# Patient Record
Sex: FEMALE | Race: WHITE | NOT HISPANIC OR LATINO | URBAN - METROPOLITAN AREA
[De-identification: names, ages, dates, MRNs, and addresses within clinical notes are randomized per-mention and may not be internally consistent; named-entity substitution may affect disease eponyms.]

---

## 2018-05-14 ENCOUNTER — APPOINTMENT (OUTPATIENT)
Dept: WOMENS IMAGING | Facility: HOSPITAL | Age: 53
End: 2018-05-14

## 2018-05-14 PROCEDURE — 77067 SCR MAMMO BI INCL CAD: CPT | Performed by: RADIOLOGY

## 2018-05-14 PROCEDURE — 77063 BREAST TOMOSYNTHESIS BI: CPT | Performed by: RADIOLOGY

## 2018-07-05 ENCOUNTER — PROCEDURE VISIT (OUTPATIENT)
Dept: OBSTETRICS AND GYNECOLOGY | Facility: CLINIC | Age: 53
End: 2018-07-05

## 2018-07-05 ENCOUNTER — OFFICE VISIT (OUTPATIENT)
Dept: OBSTETRICS AND GYNECOLOGY | Facility: CLINIC | Age: 53
End: 2018-07-05

## 2018-07-05 VITALS
HEIGHT: 68 IN | DIASTOLIC BLOOD PRESSURE: 60 MMHG | SYSTOLIC BLOOD PRESSURE: 94 MMHG | WEIGHT: 151 LBS | BODY MASS INDEX: 22.88 KG/M2

## 2018-07-05 DIAGNOSIS — R14.0 BLOATING: ICD-10-CM

## 2018-07-05 DIAGNOSIS — R14.0 BLOATING: Primary | ICD-10-CM

## 2018-07-05 DIAGNOSIS — Z01.419 WELL FEMALE EXAM WITH ROUTINE GYNECOLOGICAL EXAM: Primary | ICD-10-CM

## 2018-07-05 DIAGNOSIS — Z11.51 SPECIAL SCREENING EXAMINATION FOR HUMAN PAPILLOMAVIRUS (HPV): ICD-10-CM

## 2018-07-05 DIAGNOSIS — K59.09 OTHER CONSTIPATION: ICD-10-CM

## 2018-07-05 DIAGNOSIS — Z11.3 SCREENING EXAMINATION FOR STD (SEXUALLY TRANSMITTED DISEASE): ICD-10-CM

## 2018-07-05 LAB
B-HCG UR QL: NEGATIVE
BILIRUB BLD-MCNC: NEGATIVE MG/DL
CLARITY, POC: CLEAR
COLOR UR: YELLOW
GLUCOSE UR STRIP-MCNC: NEGATIVE MG/DL
INTERNAL NEGATIVE CONTROL: NEGATIVE
INTERNAL POSITIVE CONTROL: POSITIVE
KETONES UR QL: NEGATIVE
LEUKOCYTE EST, POC: NEGATIVE
Lab: NORMAL
NITRITE UR-MCNC: NEGATIVE MG/ML
PH UR: 6 [PH] (ref 5–8)
PROT UR STRIP-MCNC: NEGATIVE MG/DL
RBC # UR STRIP: NEGATIVE /UL
SP GR UR: 1.02 (ref 1–1.03)
UROBILINOGEN UR QL: NORMAL

## 2018-07-05 PROCEDURE — 81002 URINALYSIS NONAUTO W/O SCOPE: CPT | Performed by: OBSTETRICS & GYNECOLOGY

## 2018-07-05 PROCEDURE — 99213 OFFICE O/P EST LOW 20 MIN: CPT | Performed by: OBSTETRICS & GYNECOLOGY

## 2018-07-05 PROCEDURE — 81025 URINE PREGNANCY TEST: CPT | Performed by: OBSTETRICS & GYNECOLOGY

## 2018-07-05 PROCEDURE — 76830 TRANSVAGINAL US NON-OB: CPT | Performed by: OBSTETRICS & GYNECOLOGY

## 2018-07-05 PROCEDURE — 99386 PREV VISIT NEW AGE 40-64: CPT | Performed by: OBSTETRICS & GYNECOLOGY

## 2018-07-05 RX ORDER — TRIAMTERENE AND HYDROCHLOROTHIAZIDE 37.5; 25 MG/1; MG/1
1 CAPSULE ORAL EVERY MORNING
COMMUNITY

## 2018-07-05 NOTE — PROGRESS NOTES
GYN Annual Exam     CC- Here for annual exam.     Allyn Carpenter is a 53 y.o. female new patient who presents for annual well woman exam. She is still having cycles but they are starting to spread out some. She skipped her first month this summer. She is noticing bloating. She has a h/o chronic constipation and needs to have a C scope. She is moodier the past few years, more PMS, declines meds.       OB History      Para Term  AB Living    2 2 2     2    SAB TAB Ectopic Molar Multiple Live Births                       Obstetric Comments    2 C/S          Menarche:15  Menopause:not yet  HRT:none  Current contraception: vasectomy  History of abnormal Pap smear: yes - s/p cryp with nl f/u  History of abnormal mammogram: yes - mult B9 breast bx  Family history of uterine, colon or ovarian cancer: no  Family history of breast cancer: no  STD's: none    Health Maintenance   Topic Date Due   • ANNUAL PHYSICAL  1968   • TDAP/TD VACCINES (1 - Tdap) 1984   • ZOSTER VACCINE (1 of 2) 2015   • MAMMOGRAM  2018   • PAP SMEAR  2018   • COLONOSCOPY  2018   • INFLUENZA VACCINE  2018   • HEPATITIS C SCREENING  Completed       Past Medical History:   Diagnosis Date   • Abnormal Pap smear of cervix    • Cervical dysplasia     s/p cryo wtih normal f/u   • Meniere disease        Past Surgical History:   Procedure Laterality Date   • BREAST BIOPSY     •  SECTION      x 2   • GYNECOLOGIC CRYOSURGERY           Current Outpatient Prescriptions:   •  triamterene-hydrochlorothiazide (DYAZIDE) 37.5-25 MG per capsule, Take 1 capsule by mouth Every Morning., Disp: , Rfl:     Allergies no known allergies    Social History   Substance Use Topics   • Smoking status: Never Smoker   • Smokeless tobacco: Not on file   • Alcohol use No       Family History   Problem Relation Age of Onset   • Breast cancer Neg Hx    • Ovarian cancer Neg Hx    • Colon cancer Neg Hx    • Deep vein thrombosis  "Neg Hx    • Pulmonary embolism Neg Hx        Review of Systems   Constitutional: Negative for appetite change, fatigue, fever and unexpected weight change.   HENT: Positive for hearing loss (meniere's).    Respiratory: Negative for cough and shortness of breath.    Cardiovascular: Negative for chest pain and palpitations.   Gastrointestinal: Positive for abdominal distention and constipation. Negative for abdominal pain, diarrhea and nausea.   Endocrine: Negative for cold intolerance and heat intolerance.   Genitourinary: Negative for dyspareunia, dysuria, menstrual problem, pelvic pain and vaginal discharge.   Skin: Negative for color change and rash.   Neurological: Negative for headaches.   Hematological: Negative for adenopathy. Does not bruise/bleed easily.   Psychiatric/Behavioral: Positive for behavioral problems (moodier). Negative for dysphoric mood. The patient is not nervous/anxious.        BP 94/60   Ht 171.5 cm (67.5\")   Wt 68.5 kg (151 lb)   LMP 06/04/2018 (Within Days)   BMI 23.30 kg/m²     Physical Exam   Constitutional: She is oriented to person, place, and time. She appears well-developed and well-nourished.   HENT:   Head: Normocephalic and atraumatic.   Eyes: Conjunctivae are normal. No scleral icterus.   Neck: Neck supple. No thyromegaly present.   Cardiovascular: Normal rate, regular rhythm and normal heart sounds.    Pulmonary/Chest: Effort normal and breath sounds normal. Right breast exhibits no inverted nipple, no mass, no nipple discharge, no skin change and no tenderness. Left breast exhibits no inverted nipple, no mass, no nipple discharge, no skin change and no tenderness.   Abdominal: Soft. Bowel sounds are normal. She exhibits no distension and no mass. There is no tenderness. There is no rebound and no guarding. No hernia.   Genitourinary: Uterus normal. Pelvic exam was performed with patient supine. There is no rash, tenderness or lesion on the right labia. There is no rash, " tenderness or lesion on the left labia. Cervix exhibits no motion tenderness, no discharge and no friability. Right adnexum displays no mass, no tenderness and no fullness. Left adnexum displays no mass, no tenderness and no fullness. No erythema, tenderness or bleeding in the vagina. No foreign body in the vagina. No signs of injury around the vagina. No vaginal discharge found.   Neurological: She is alert and oriented to person, place, and time.   Skin: Skin is warm and dry.   Psychiatric: She has a normal mood and affect. Her behavior is normal. Judgment and thought content normal.   Nursing note and vitals reviewed.         Assessment/Plan    1) GYN HM: check pap/HPV   SBE demonstrated and encouraged.  2) STD screening: accepts Condoms encouraged.  3) Bone health - Weight bearing exercise, dietary calcium recommendations and vitamin D reviewed.   4) Diet and Exercise discussed  5) Smoking Status: non smoker  6) Social: no issues  7)MMG:  UTD WDI, get report  8) DEXA-plan age 55  9)C scope-refer.   10) Bloating- check TVUS.   Follow up prn and 1 year       Allyn was seen today for gynecologic exam.    Diagnoses and all orders for this visit:    Well female exam with routine gynecological exam  -     POC Urinalysis Dipstick  -     POC Pregnancy, Urine  -     Pap IG, HPV-hr    Special screening examination for human papillomavirus (HPV)  -     Pap IG, HPV-hr    Other constipation  -     Ambulatory Referral For Screening Colonoscopy    Screening examination for STD (sexually transmitted disease)  -     Chlamydia trachomatis, Neisseria gonorrhoeae, Trichomonas vaginalis, PCR - Urine, Urine, Random Void  -     Hepatitis B Surface Antigen  -     Hepatitis C Antibody  -     HIV-1 / O / 2 Ag / Antibody 4th Generation  -     HSV 1 & 2 - Specific Antibody, IgG  -     RPR, Rfx Qn RPR / Confirm TP        Leslye Rosas MD  7/5/18  8:38 PM

## 2018-07-06 LAB
HBV SURFACE AG SERPL QL IA: NEGATIVE
HCV AB S/CO SERPL IA: <0.1 S/CO RATIO (ref 0–0.9)
HIV 1+2 AB+HIV1 P24 AG SERPL QL IA: NON REACTIVE
HSV1 IGG SER IA-ACNC: <0.91 INDEX (ref 0–0.9)
HSV2 IGG SER IA-ACNC: <0.91 INDEX (ref 0–0.9)
RPR SER QL: NON REACTIVE

## 2018-07-07 LAB
C TRACH RRNA SPEC QL NAA+PROBE: NEGATIVE
N GONORRHOEA RRNA SPEC QL NAA+PROBE: NEGATIVE
T VAGINALIS RRNA SPEC QL NAA+PROBE: NEGATIVE

## 2018-07-08 NOTE — PROGRESS NOTES
PIP- US shows normal uterus. There is a very small cyst on her R ovary ( 1.5 x 1.5 x 1.2 cm). This is not big enough to cause her bloating symptoms. No comparable data.

## 2018-07-09 LAB
CYTOLOGIST CVX/VAG CYTO: NORMAL
CYTOLOGY CVX/VAG DOC THIN PREP: NORMAL
DX ICD CODE: NORMAL
HIV 1 & 2 AB SER-IMP: NORMAL
HPV I/H RISK 1 DNA CVX QL PROBE+SIG AMP: NEGATIVE
OTHER STN SPEC: NORMAL
PATH REPORT.FINAL DX SPEC: NORMAL
STAT OF ADQ CVX/VAG CYTO-IMP: NORMAL

## 2018-07-23 ENCOUNTER — TELEPHONE (OUTPATIENT)
Dept: SURGERY | Facility: CLINIC | Age: 53
End: 2018-07-23